# Patient Record
Sex: FEMALE | Race: BLACK OR AFRICAN AMERICAN | Employment: FULL TIME | URBAN - METROPOLITAN AREA
[De-identification: names, ages, dates, MRNs, and addresses within clinical notes are randomized per-mention and may not be internally consistent; named-entity substitution may affect disease eponyms.]

---

## 2022-11-18 ENCOUNTER — HOSPITAL ENCOUNTER (OUTPATIENT)
Dept: ULTRASOUND IMAGING | Age: 37
Discharge: HOME | End: 2022-11-18
Attending: FAMILY MEDICINE
Payer: COMMERCIAL

## 2022-11-18 ENCOUNTER — TRANSCRIBE ORDER (OUTPATIENT)
Dept: SCHEDULING | Age: 37
End: 2022-11-18

## 2022-11-18 DIAGNOSIS — D25.9 UTERINE LEIOMYOMA: Primary | ICD-10-CM

## 2022-11-18 DIAGNOSIS — D25.9 UTERINE LEIOMYOMA: ICD-10-CM

## 2022-11-18 PROCEDURE — 76856 US EXAM PELVIC COMPLETE: CPT

## 2022-12-13 ENCOUNTER — OFFICE VISIT (OUTPATIENT)
Dept: OBGYN CLINIC | Age: 37
End: 2022-12-13
Payer: COMMERCIAL

## 2022-12-13 VITALS — DIASTOLIC BLOOD PRESSURE: 76 MMHG | SYSTOLIC BLOOD PRESSURE: 110 MMHG | WEIGHT: 114 LBS

## 2022-12-13 DIAGNOSIS — D25.9 UTERINE LEIOMYOMA, UNSPECIFIED LOCATION: Primary | ICD-10-CM

## 2022-12-13 PROCEDURE — 99205 OFFICE O/P NEW HI 60 MIN: CPT | Performed by: OBSTETRICS & GYNECOLOGY

## 2022-12-13 NOTE — PROGRESS NOTES
GYN Problem Visit    Wanda Jara is a 40 y.o.  presenting for problem visit. Her main concern today is discussing her fibroids. Patient was referred by PCP. She is having intermittent pelvic pain. Reports hx of myomectomy in 2009 in [place]. She does not have children and in not . She does desire a family at some point. FINDINGS:  UTERUS:  The uterus is enlarged and contains numerous masses and measures 19 x 11.9 x 7.7  cm. There are multiple uterine masses throughout the uterus with the largest  mass noted anteriorly on the left exophytic measuring 5.4 x 6.4 x 3.7 cm and  another mass projected off the uterine fundus measuring 6.6 x 5 x 6.1 cm. Ob/Gyn Hx:  G0  LMP- 12/1-12/12  Menses- once per month. Lasting 5-7 day. Denies heavy bleeding or flooding. Contraception- n/a  SA- never   STI- denies     Screening:  Pap: 2-3yrs ago; denies hx of abn   Gardasil: 0/3      Past Medical History:   Diagnosis Date    Fibroids        Past Surgical History:   Procedure Laterality Date    HX MYOMECTOMY  2009       History reviewed. No pertinent family history.     Social History     Socioeconomic History    Marital status: SINGLE     Spouse name: Not on file    Number of children: Not on file    Years of education: Not on file    Highest education level: Not on file   Occupational History    Not on file   Tobacco Use    Smoking status: Never    Smokeless tobacco: Never   Vaping Use    Vaping Use: Never used   Substance and Sexual Activity    Alcohol use: Never    Drug use: Never    Sexual activity: Never   Other Topics Concern    Not on file   Social History Narrative    Not on file     Social Determinants of Health     Financial Resource Strain: Not on file   Food Insecurity: Not on file   Transportation Needs: Not on file   Physical Activity: Not on file   Stress: Not on file   Social Connections: Not on file   Intimate Partner Violence: Not on file   Housing Stability: Not on file       Current Outpatient Medications   Medication Sig Dispense Refill    zinc sulfate (ZINC-15 PO) Take  by mouth.      magnesium carb,citrate,oxide (MAGNESIUM COMPLEX PO) Take  by mouth. COLLAGEN by Does Not Apply route. BIOTIN PO Take  by mouth. Allergies   Allergen Reactions    Nsaids (Non-Steroidal Anti-Inflammatory Drug) Other (comments)     REPORTS GI UPSET        Review of Systems - History obtained from the patient  Constitutional: negative for weight loss, fever, night sweats  HEENT: negative for hearing loss, earache, congestion, snoring, sorethroat  CV: negative for chest pain, palpitations, edema  Resp: negative for cough, shortness of breath, wheezing  GI: negative for change in bowel habits, abdominal pain, black or bloody stools  : negative for frequency, dysuria, hematuria, vaginal discharge  MSK: negative for back pain, joint pain, muscle pain  Breast: negative for breast lumps, nipple discharge, galactorrhea  Skin :negative for itching, rash, hives  Neuro: negative for dizziness, headache, confusion, weakness  Psych: negative for anxiety, depression, change in mood  Heme/lymph: negative for bleeding, bruising, pallor    Physical Exam    Visit Vitals  /76   Wt 114 lb (51.7 kg)         OBGyn Exam      Constitutional  Appearance: well-nourished, well developed, alert, in no acute distress    HENT  Head and Face: appears normal    Chest  Respiratory Effort: non-labored breathing    Cardiovascular  Extremities: no peripheral edema    Gastrointestinal  Abdominal Examination: Enlarged fibroid uterus palpated abdominally. Uterus palpates well above the umbilicus on the left-hand side.   Liver and spleen: no hepatomegaly present, spleen not palpable  Hernias: no hernias identified    Genitourinary  Declines exam     Skin  General Inspection: no rash, no lesions identified    Neurologic/Psychiatric  Mental Status:  Orientation: grossly oriented to person, place and time  Mood and Affect: mood normal, affect appropriate      Assessment/Plan:  Maribeth Ramirez IS A 40 y.o. No obstetric history on file. 1. Uterine leiomyoma, unspecified location  -Patient with an enlarged fibroid uterus. Ultrasound results reviewed. Uterus palpates well above the umbilicus on physical exam.  Patient with a history of myomectomy in 2009 in [place]. We discussed her desire to maintain her fertility as she does not have any children yet. Patient is not currently in a relationship. She is not trying to conceive at this time. Discussed likely difficulty conceiving given enlarged fibroid uterus. We discussed all treatment options to manage enlarged fibroid uterus in the setting of desire fertility. Patient does not currently report heavy bleeding. She does report intermittent pelvic pain which I suspect is secondary to her fibroid uterus. We discussed abdominal myomectomy versus Acessa procedure. Patient would likely have to have multiple Acessa treatments with the excessive procedure given the size of her uterus. We did discuss Oriahnn and Myfembree. However, given the patient's chief complaint is not heavy menstrual bleeding I doubt this will be very helpful. We did discuss the risk, side effects and possible complications to all treatment options. Patient has an herbal liquid that she would like to try. She states that many women in [place] take this herbal liquid when they have enlarged fibroid uterus. Patient would like to try this for 3 months and follow-up for repeat ultrasound. All questions answered today. Patient will return to office in three months          [address]  Cheyenne Nino MD

## 2023-03-13 ENCOUNTER — OFFICE VISIT (OUTPATIENT)
Dept: OBGYN CLINIC | Age: 38
End: 2023-03-13
Payer: COMMERCIAL

## 2023-03-13 VITALS — DIASTOLIC BLOOD PRESSURE: 68 MMHG | WEIGHT: 120 LBS | SYSTOLIC BLOOD PRESSURE: 116 MMHG

## 2023-03-13 DIAGNOSIS — D21.9 FIBROIDS: ICD-10-CM

## 2023-03-13 DIAGNOSIS — Z01.419 WELL WOMAN EXAM WITH ROUTINE GYNECOLOGICAL EXAM: Primary | ICD-10-CM

## 2023-03-13 PROCEDURE — 99203 OFFICE O/P NEW LOW 30 MIN: CPT | Performed by: OBSTETRICS & GYNECOLOGY

## 2023-03-13 PROCEDURE — 99385 PREV VISIT NEW AGE 18-39: CPT | Performed by: OBSTETRICS & GYNECOLOGY

## 2023-03-13 NOTE — PROGRESS NOTES
Annual Well Women Exam    Tatiana Loera is a 40 y.o. presenting for annual exam. Her main concerns today include fibroids and annual well women exam. Patient denies any pain since November when she presented to the ER. Her fibroids have been present for years. Hx of open myomectomy in 2009. She is not having heavy bleeding with her cycles. Patient has never been sexual active and declines any STD testing today. US today:  TA ULTRASOUND- UTERUS TOO ENLARGED FOR TV. THE UTERUS IS ANTEVERTED, ENLARGED IN SIZE, AND HETEROGENEOUS IN ECHOGENICITY. THERE APPEARS TO BE MULTIPLE FIBROIDS VISUALIZED. MIDLINE ANTERIOR SUBMUCOSAL VS INTRAMURAL  MEASURING 18 X 21 MM. MIDLINE POSTERIOR SUBMUCOSAL MEASURING 59 X 52 MM. ANSELMO RIGHT SUBSEROSAL  MEASURING 72 X 64 MM. FUNDAL LEFT PEDUNCULATED MEASURING 105 X 66 X 74 MM. MIDLINE FUNDAL  SUBSEROSAL MEASURING 36 X 32 MM. FUNDAL RIGHT PEDUNCULATED VS SUBSEROSAL MEASURING 57 X 49  MM. LEFT PEDUNCULATED MEASURING 74 X 53 X 59 MM. RIGHT INTRAMURAL MEASURING 35 X 35 MM AND 29 X  33 MM. RIGHT SUBSEROSAL MEASURING 30 X 29 MM. THE ENDOMETRIUM MEASURES 9.38 MM IN THICKNESS. BILATERAL OVARIES NOT VISUALIZED DUE TO UTERUS AND FIBROIDS SIZE. BILATERAL ADNEXA APPEARS WNL. NO FREE FLUID IS SEEN IN THE CDS. She declines a chaperone during the gynecologic exam today. Ob/Gyn Hx:  G 0P0  -   LMP - 3/5/2023  Menses - monthly lasting 4 days max. Denies heavy bleeding. No flooding. No clots   Contraception -none  STI - declines  SA - denies    Health maintenance:  Pap - cannot remember last one  Gardasil - has not received any. Past Medical History:   Diagnosis Date    Fibroids        Past Surgical History:   Procedure Laterality Date    HX MYOMECTOMY  2009       History reviewed. No pertinent family history.     Social History     Socioeconomic History    Marital status: SINGLE     Spouse name: Not on file    Number of children: Not on file    Years of education: Not on file Highest education level: Not on file   Occupational History    Not on file   Tobacco Use    Smoking status: Never    Smokeless tobacco: Never   Vaping Use    Vaping Use: Never used   Substance and Sexual Activity    Alcohol use: Never    Drug use: Never    Sexual activity: Never   Other Topics Concern    Not on file   Social History Narrative    Not on file     Social Determinants of Health     Financial Resource Strain: Not on file   Food Insecurity: Not on file   Transportation Needs: Not on file   Physical Activity: Not on file   Stress: Not on file   Social Connections: Not on file   Intimate Partner Violence: Not on file   Housing Stability: Not on file       Current Outpatient Medications   Medication Sig Dispense Refill    zinc sulfate (ZINC-15 PO) Take  by mouth. BIOTIN PO Take  by mouth.      magnesium carb,citrate,oxide (MAGNESIUM COMPLEX PO) Take  by mouth. (Patient not taking: Reported on 3/13/2023)      COLLAGEN by Does Not Apply route.  (Patient not taking: Reported on 3/13/2023)         Allergies   Allergen Reactions    Nsaids (Non-Steroidal Anti-Inflammatory Drug) Other (comments)     REPORTS GI UPSET        Review of Systems - History obtained from the patient  Constitutional: negative for weight loss, fever, night sweats  HEENT: negative for hearing loss, earache, congestion, snoring, sorethroat  CV: negative for chest pain, palpitations, edema  Resp: negative for cough, shortness of breath, wheezing  GI: negative for change in bowel habits, abdominal pain, black or bloody stools  : negative for frequency, dysuria, hematuria, vaginal discharge  MSK: negative for back pain, joint pain, muscle pain  Breast: negative for breast lumps, nipple discharge, galactorrhea  Skin :negative for itching, rash, hives  Neuro: negative for dizziness, headache, confusion, weakness  Psych: negative for anxiety, depression, change in mood  Heme/lymph: negative for bleeding, bruising, pallor    Physical Exam    Visit Vitals  /68   Wt 120 lb (54.4 kg)   LMP 03/05/2023 (Approximate)       Constitutional  Appearance: well-nourished, well developed, alert, in no acute distress    HENT  Head and Face: appears normal    Neck  Inspection/Palpation: normal appearance, no masses or tenderness  Thyroid: gland size normal, nontender, no nodules or masses present on palpation    Chest  Respiratory Effort: non-labored breathing  Auscultation: CTAB, normal breath sounds    Cardiovascular  Heart: Auscultation: regular rate and rhythm without murmur  Extremities: no peripheral edema    Breasts  Inspection of Breasts: breasts symmetrical, no skin changes, no discharge present, nipple appearance normal, no skin retraction present  Palpation of Breasts and Axillae: no masses present on palpation, no breast tenderness  Axillary Lymph Nodes: no lymphadenopathy present    Gastrointestinal  Abdominal Examination: abdomen non-tender to palpation, normal bowel sounds, Bulky uterus palpated abdominally, pedunculated fibroid palpated just under the left costal margin.    Liver and spleen: no hepatomegaly present, spleen not palpable  Hernias: no hernias identified    Genitourinary  External Genitalia: normal appearance for age, no discharge present, no tenderness present, no inflammatory lesions present, no masses present, no atrophy present  Vagina: normal vaginal vault without central or paravaginal defects, no discharge present, no inflammatory lesions present, no masses present  Bladder: non-tender to palpation  Urethra: appears normal  Cervix: normal   Uterus: enlarged/bulky   Adnexa: no adnexal tenderness present, no adnexal masses present  Perineum: perineum within normal limits, no evidence of trauma, no rashes or skin lesions present    Neurologic/Psychiatric  Mental Status:  Orientation: grossly oriented to person, place and time  Mood and Affect: mood normal, affect appropriate      Assessment/Plan:  Pamela ernandez 40 y.o. presenting for annual exam. Overall doing well. 1. Well woman exam with routine gynecological exam  - PAP IG, RFX APTIMA HPV ASCUS ([id number])    2. Fibroids  -US ordered and discussed in detail. Additional fibroids were included in the measurements today. Overall it appears of similar size. Patient not sexually active and not currently in a relationship. She is asymptomatic at this time and does not desire ANY intervention. She understands the uterus will continue to grow over time which will make surgical intervention more difficult/complicated. She would like to be evaluated in year one or sooner should her symptoms reoccur. Well woman exam:  Normal gynecologic and breast exams. Healthy habits and lifestyle reviewed. Pap with HPV was performed today. Patient declines STD screening. Contraception and menstrual regulation - patient opts for none      Patient will follow up PRN or next annual exam.      Roxana Jama.  Pat Mccarthy MD

## 2023-03-17 LAB
CYTOLOGIST CVX/VAG CYTO: NORMAL
CYTOLOGY CVX/VAG DOC CYTO: NORMAL
CYTOLOGY CVX/VAG DOC THIN PREP: NORMAL
DX ICD CODE: NORMAL
LABCORP, 190119: NORMAL
Lab: NORMAL
Lab: NORMAL
OTHER STN SPEC: NORMAL
STAT OF ADQ CVX/VAG CYTO-IMP: NORMAL

## 2024-03-14 ENCOUNTER — OFFICE VISIT (OUTPATIENT)
Age: 39
End: 2024-03-14
Payer: COMMERCIAL

## 2024-03-14 VITALS — SYSTOLIC BLOOD PRESSURE: 94 MMHG | DIASTOLIC BLOOD PRESSURE: 60 MMHG | WEIGHT: 117 LBS

## 2024-03-14 DIAGNOSIS — Z01.419 WELL WOMAN EXAM: Primary | ICD-10-CM

## 2024-03-14 DIAGNOSIS — R82.90 ABNORMAL URINE ODOR: ICD-10-CM

## 2024-03-14 PROCEDURE — 99395 PREV VISIT EST AGE 18-39: CPT | Performed by: OBSTETRICS & GYNECOLOGY

## 2024-03-14 NOTE — PROGRESS NOTES
intervention gets more complicated as her uterus enlarges.     Well woman exam:  Normal gynecologic and breast exams.   Healthy habits and lifestyle reviewed.   Pap was not performed today at patient request   Patient declines STD screening.  Contraception and menstrual regulation - patient opts for none  Dermatology screening discussed   Lipids with PCP      Patient will follow up Return 2-3 months for US (uterine fibroids) and pap smear.        Please note that portions of this note was potentially completed with Dragon dictation, the computer voice recognition software. Quite often unanticipated grammatical, syntax, homophones, and other interpretive errors are inadvertently transcribed by the computer software.  Please disregard these errors.  Please excuse any errors that have escaped final proofreading.  Thank you.       Alla Jackson MD

## 2024-05-14 ENCOUNTER — OFFICE VISIT (OUTPATIENT)
Age: 39
End: 2024-05-14
Payer: COMMERCIAL

## 2024-05-14 VITALS — WEIGHT: 116.4 LBS | DIASTOLIC BLOOD PRESSURE: 62 MMHG | SYSTOLIC BLOOD PRESSURE: 106 MMHG

## 2024-05-14 DIAGNOSIS — R87.615 UNSATISFACTORY CYTOLOGY OF CERVICAL PAPANICOLAOU SMEAR: ICD-10-CM

## 2024-05-14 DIAGNOSIS — D25.9 UTERINE LEIOMYOMA, UNSPECIFIED LOCATION: Primary | ICD-10-CM

## 2024-05-14 DIAGNOSIS — R82.90 ABNORMAL URINE ODOR: ICD-10-CM

## 2024-05-14 PROCEDURE — 99214 OFFICE O/P EST MOD 30 MIN: CPT | Performed by: OBSTETRICS & GYNECOLOGY

## 2024-05-14 RX ORDER — SUMATRIPTAN 25 MG/1
TABLET, FILM COATED ORAL
COMMUNITY
Start: 2024-04-17

## 2024-05-14 RX ORDER — FAMOTIDINE 40 MG/1
TABLET, FILM COATED ORAL
COMMUNITY
Start: 2024-04-17

## 2024-05-14 NOTE — PROGRESS NOTES
GYN Problem Visit    Rosalia Ramesh is a 38 y.o.  presenting for problem visit. Her main concern today is uterine fibroids. Recent unsat pap. No heavy menstrual bleeding. Denies abdominal pain. Not dating anyone right now. Hx of myomectomy in the past. Has been trying to avoid surgery.       Past Medical History:   Diagnosis Date    Fibroids        Past Surgical History:   Procedure Laterality Date    MYOMECTOMY  2009       No family history on file.    Social History     Socioeconomic History    Marital status: Single     Spouse name: Not on file    Number of children: Not on file    Years of education: Not on file    Highest education level: Not on file   Occupational History    Not on file   Tobacco Use    Smoking status: Never    Smokeless tobacco: Never   Vaping Use    Vaping Use: Never used   Substance and Sexual Activity    Alcohol use: Never    Drug use: Never    Sexual activity: Never     Birth control/protection: None   Other Topics Concern    Not on file   Social History Narrative    Not on file     Social Determinants of Health     Financial Resource Strain: Not on file   Food Insecurity: Not on file   Transportation Needs: Not on file   Physical Activity: Not on file   Stress: Not on file   Social Connections: Not on file   Intimate Partner Violence: Not on file   Housing Stability: Not on file       Current Outpatient Medications   Medication Sig Dispense Refill    Omega-3 Fatty Acids (FISH OIL PO) Take by mouth      BIOTIN PO Take by mouth       No current facility-administered medications for this visit.       Allergies   Allergen Reactions    Nsaids Other (See Comments)     REPORTS GI UPSET        Review of Systems - History obtained from the patient  Constitutional: negative for weight loss, fever, night sweats  HEENT: negative for hearing loss, earache, congestion, snoring, sorethroat  CV: negative for chest pain, palpitations, edema  Resp: negative for cough, shortness of breath, wheezing  GI:

## 2024-05-14 NOTE — PROGRESS NOTES
Rosalia Ramesh is a 38 y.o.  presenting for ultrasound and annual exam. Her main concerns today include     Chief Complaint   Patient presents with    Follow-up    Ultrasound     Ultrasound today 2024:  TA ULTRASOUND--- SUBOPTIMAL VIEWS DUE TO ENLARGED FIBROIDS THE UTERUS IS ANTEVERTED, ENLARGED IN SIZE, AND HETEROGENOUS IN ECHOGENICITY. THERE APPEARS TO BE MULTIPLE FIBROIDS SEEN THROUGHOUT THE UTERUS. NANCY MEASURING 78 X 66 MM. ML FUNDAL PEDUNCULATED MEASURING 111 X 76 MM. LT PEDUNCULATED MEASURING 116 X 63 MM. THE ENDOMETRIAL LINING IS ILL DEFINED DUE TO ENLARGED FIBROIDS. BILATERAL OVARIES NOT SEEN DUE TO FIBROIDS. BILATERAL ADNEXAS APPEARS WNL. NO FREE FLUID IS SEEN IN THE CDS.    Ob/Gyn Hx:    LMP - Patient's last menstrual period was 05/10/2024.  Contraception - none.  Hx of STI - No    SA - No      Health Maintenance:  Last Pap: 2023 unsatisfactory    1. Have you been to the ER, urgent care clinic, or hospitalized since your last visit?No    2. Have you seen or consulted any other health care providers outside of the [facility] since your last visit? No    Patient declines chaperone.    MICHELLE DOWNS RN

## 2024-05-16 LAB
BACTERIA SPEC CULT: NORMAL
SERVICE CMNT-IMP: NORMAL

## 2024-05-21 LAB
CYTOLOGIST CVX/VAG CYTO: NORMAL
CYTOLOGY CVX/VAG DOC CYTO: NORMAL
CYTOLOGY CVX/VAG DOC THIN PREP: NORMAL
DX ICD CODE: NORMAL
HPV GENOTYPE REFLEX: NORMAL
HPV I/H RISK 4 DNA CVX QL PROBE+SIG AMP: NEGATIVE
Lab: NORMAL
OTHER STN SPEC: NORMAL
STAT OF ADQ CVX/VAG CYTO-IMP: NORMAL

## 2024-06-06 ENCOUNTER — OFFICE VISIT (OUTPATIENT)
Age: 39
End: 2024-06-06
Payer: COMMERCIAL

## 2024-06-06 VITALS
HEIGHT: 67 IN | SYSTOLIC BLOOD PRESSURE: 118 MMHG | HEART RATE: 69 BPM | BODY MASS INDEX: 17.58 KG/M2 | DIASTOLIC BLOOD PRESSURE: 74 MMHG | WEIGHT: 112 LBS

## 2024-06-06 DIAGNOSIS — D25.9 UTERINE LEIOMYOMA, UNSPECIFIED LOCATION: Primary | ICD-10-CM

## 2024-06-06 PROCEDURE — 99203 OFFICE O/P NEW LOW 30 MIN: CPT | Performed by: OBSTETRICS & GYNECOLOGY

## 2024-06-06 NOTE — PROGRESS NOTES
New patient referred by Dr. Jackson.   
in the uterus, their relation to other structures, and their relation to the blood supply of the uterus.   Based upon the size of the uterus alone, I doubt she will be an appropriate candidate for myomectomy.  I explained that even if we are able to perform a myomectomy, it does not guarantee the ability to get pregnant, especially at her age.  I will see her back after the MRI to review the results with her.  We will then have a discussion about the best course of management.        An electronic signature was used to sign this note.    Lester Saenz MD  06/06/24

## 2024-06-19 ENCOUNTER — HOSPITAL ENCOUNTER (OUTPATIENT)
Facility: HOSPITAL | Age: 39
Discharge: HOME | End: 2024-06-22
Attending: OBSTETRICS & GYNECOLOGY
Payer: COMMERCIAL

## 2024-06-19 DIAGNOSIS — D25.9 UTERINE LEIOMYOMA, UNSPECIFIED LOCATION: ICD-10-CM

## 2024-06-19 PROCEDURE — 6360000004 HC RX CONTRAST MEDICATION: Performed by: OBSTETRICS & GYNECOLOGY

## 2024-06-19 PROCEDURE — 72197 MRI PELVIS W/O & W/DYE: CPT

## 2024-06-19 PROCEDURE — A9579 GAD-BASE MR CONTRAST NOS,1ML: HCPCS | Performed by: OBSTETRICS & GYNECOLOGY

## 2024-06-19 RX ADMIN — GADOTERIDOL 10 ML: 279.3 INJECTION, SOLUTION INTRAVENOUS at 20:15

## 2024-06-27 ENCOUNTER — OFFICE VISIT (OUTPATIENT)
Age: 39
End: 2024-06-27
Payer: COMMERCIAL

## 2024-06-27 VITALS
BODY MASS INDEX: 17.58 KG/M2 | DIASTOLIC BLOOD PRESSURE: 72 MMHG | HEART RATE: 91 BPM | WEIGHT: 112 LBS | HEIGHT: 67 IN | SYSTOLIC BLOOD PRESSURE: 121 MMHG

## 2024-06-27 DIAGNOSIS — D25.9 UTERINE LEIOMYOMA, UNSPECIFIED LOCATION: Primary | ICD-10-CM

## 2024-06-27 PROCEDURE — 99213 OFFICE O/P EST LOW 20 MIN: CPT | Performed by: OBSTETRICS & GYNECOLOGY

## 2024-06-27 NOTE — PROGRESS NOTES
[facility] GYNECOLOGIC ONCOLOGY  [address] [place] [address], [address]  [place] [address]  P [phone]  F [phone]    Office Note  Patient ID:  Name:  Rosalia Ramesh  MRN:  [id number]  :  1985/38 y.o.  Date:  2024      HISTORY OF PRESENT ILLNESS:  Rosalia Ramesh is a 38 y.o.  ([place]) premenopausal female who is a newly established patient being seen for uterine fibroids and possible myomectomy.  She is referred by Dr. Sherron Jackson with [facility] [place].  She has a markedly enlarged uterus from fibroids.  She has undergone prior myomectomy in  in [facility].  She wishes to maintain fertility and is interested in myomectomy.  Dr. Jackson has asked that I see her in consultation.  She states that her periods are not that heavy or painful.       I explained to her that I would not even consider a myomectomy until after we get an MRI.  This will help me determine the number of fibroids and their location in the uterus, their relation to other structures, and their relation to the blood supply of the uterus.   Based upon the size of the uterus alone, I doubted she would be an appropriate candidate for myomectomy.  I explained that even if we are able to perform a myomectomy, it does not guarantee the ability to get pregnant, especially at her age.    She presents today to review the MRI results.         ROS:   and GI review:  Negative  Cardiopulmonary review:  Negative   Musculoskeletal:  Negative    A comprehensive review of systems was negative except for that written in the History of Present Illness., 10 point ROS      OB/GYN ROS/HX:        Problem List:  There are no problems to display for this patient.    PMH:  Past Medical History:   Diagnosis Date    Fibroids       PSH:  Past Surgical History:   Procedure Laterality Date    MYOMECTOMY        Social History:  Social History     Tobacco Use    Smoking status: Never    Smokeless tobacco: Never   Substance

## 2024-08-08 ENCOUNTER — TELEPHONE (OUTPATIENT)
Age: 39
End: 2024-08-08

## 2024-08-08 NOTE — TELEPHONE ENCOUNTER
Called patient to see if she would like to schedule her surgery with Dr. Saenz. She stated [place] the country after having to leave abruptly for an emergency. She stated she needs to \"gather her thoughts\" and will call me when she is ready.

## 2025-02-04 NOTE — PROGRESS NOTES
GYN Problem Visit    Rosalia Ramesh is a 39 y.o.  presenting for problem visit. Her main concern today is uterine fibroids. No heavy menstrual bleeding or pain. Got engaged on Monday! Hx of myomectomy in the past. Has been trying to avoid surgery. Saw GYN ONC.    Patient's last menstrual period was 01/29/2025 (exact date).  Birth Control: none.  Last Pap: 5/14/2024 NILM HPV Neg    Past Medical History:   Diagnosis Date    Fibroids        Past Surgical History:   Procedure Laterality Date    MYOMECTOMY  2009       History reviewed. No pertinent family history.    Social History     Socioeconomic History    Marital status: Single     Spouse name: Not on file    Number of children: Not on file    Years of education: Not on file    Highest education level: Not on file   Occupational History    Not on file   Tobacco Use    Smoking status: Never    Smokeless tobacco: Never   Vaping Use    Vaping status: Never Used   Substance and Sexual Activity    Alcohol use: Never    Drug use: Never    Sexual activity: Never     Birth control/protection: None   Other Topics Concern    Not on file   Social History Narrative    Not on file     Social Determinants of Health     Financial Resource Strain: Not on file   Food Insecurity: Not on file   Transportation Needs: Not on file   Physical Activity: Not on file   Stress: Not on file   Social Connections: Not on file   Intimate Partner Violence: Not on file   Housing Stability: Not on file       Current Outpatient Medications   Medication Sig Dispense Refill    SUMAtriptan (IMITREX) 25 MG tablet       famotidine (PEPCID) 40 MG tablet       Omega-3 Fatty Acids (FISH OIL PO) Take by mouth      BIOTIN PO Take by mouth       No current facility-administered medications for this visit.       Allergies   Allergen Reactions    Nsaids Other (See Comments)     REPORTS GI UPSET        Review of Systems - History obtained from the patient  Constitutional: negative for weight loss, fever, night

## 2025-02-06 ENCOUNTER — OFFICE VISIT (OUTPATIENT)
Age: 40
End: 2025-02-06
Payer: COMMERCIAL

## 2025-02-06 VITALS
SYSTOLIC BLOOD PRESSURE: 119 MMHG | OXYGEN SATURATION: 97 % | TEMPERATURE: 97 F | DIASTOLIC BLOOD PRESSURE: 80 MMHG | WEIGHT: 117 LBS | BODY MASS INDEX: 18.32 KG/M2

## 2025-02-06 DIAGNOSIS — D25.9 UTERINE LEIOMYOMA, UNSPECIFIED LOCATION: Primary | ICD-10-CM

## 2025-02-06 PROCEDURE — 99214 OFFICE O/P EST MOD 30 MIN: CPT | Performed by: OBSTETRICS & GYNECOLOGY

## 2025-02-06 NOTE — PROGRESS NOTES
Rosalia Ramesh is a 39 y.o. female presents for a problem visit.        Patient's last menstrual period was 01/29/2025 (exact date).  Birth Control: none.  Last Pap: 5/14/2024 NILM HPV Neg    Pt presents to follow up on fibroids        1. Have you been to the ER, urgent care clinic, or hospitalized since your last visit? No    2. Have you seen or consulted any other health care providers outside of the [facility] since your last visit? No